# Patient Record
Sex: MALE | Race: WHITE | NOT HISPANIC OR LATINO | Employment: FULL TIME | URBAN - METROPOLITAN AREA
[De-identification: names, ages, dates, MRNs, and addresses within clinical notes are randomized per-mention and may not be internally consistent; named-entity substitution may affect disease eponyms.]

---

## 2024-05-10 ENCOUNTER — TELEPHONE (OUTPATIENT)
Dept: GASTROENTEROLOGY | Facility: CLINIC | Age: 39
End: 2024-05-10

## 2024-05-10 ENCOUNTER — APPOINTMENT (OUTPATIENT)
Dept: LAB | Facility: HOSPITAL | Age: 39
End: 2024-05-10
Payer: COMMERCIAL

## 2024-05-10 ENCOUNTER — OFFICE VISIT (OUTPATIENT)
Dept: GASTROENTEROLOGY | Facility: CLINIC | Age: 39
End: 2024-05-10
Payer: COMMERCIAL

## 2024-05-10 VITALS
DIASTOLIC BLOOD PRESSURE: 78 MMHG | BODY MASS INDEX: 21.42 KG/M2 | SYSTOLIC BLOOD PRESSURE: 118 MMHG | HEIGHT: 71 IN | WEIGHT: 153 LBS

## 2024-05-10 DIAGNOSIS — R10.84 GENERALIZED ABDOMINAL PAIN: ICD-10-CM

## 2024-05-10 DIAGNOSIS — R19.4 CHANGE IN BOWEL HABITS: Primary | ICD-10-CM

## 2024-05-10 DIAGNOSIS — R19.4 CHANGE IN BOWEL HABITS: ICD-10-CM

## 2024-05-10 PROBLEM — J45.909 ASTHMA: Status: ACTIVE | Noted: 2024-05-10

## 2024-05-10 LAB
ALBUMIN SERPL BCP-MCNC: 5.3 G/DL (ref 3.5–5)
ALP SERPL-CCNC: 60 U/L (ref 34–104)
ALT SERPL W P-5'-P-CCNC: 15 U/L (ref 7–52)
ANION GAP SERPL CALCULATED.3IONS-SCNC: 10 MMOL/L (ref 4–13)
AST SERPL W P-5'-P-CCNC: 16 U/L (ref 13–39)
BILIRUB SERPL-MCNC: 1.34 MG/DL (ref 0.2–1)
BUN SERPL-MCNC: 17 MG/DL (ref 5–25)
CALCIUM SERPL-MCNC: 9.8 MG/DL (ref 8.4–10.2)
CHLORIDE SERPL-SCNC: 106 MMOL/L (ref 96–108)
CO2 SERPL-SCNC: 26 MMOL/L (ref 21–32)
CREAT SERPL-MCNC: 0.91 MG/DL (ref 0.6–1.3)
CRP SERPL QL: <1 MG/L
ERYTHROCYTE [DISTWIDTH] IN BLOOD BY AUTOMATED COUNT: 12 % (ref 11.6–15.1)
ERYTHROCYTE [SEDIMENTATION RATE] IN BLOOD: 4 MM/HOUR (ref 0–14)
GFR SERPL CREATININE-BSD FRML MDRD: 106 ML/MIN/1.73SQ M
GLUCOSE P FAST SERPL-MCNC: 105 MG/DL (ref 65–99)
HCT VFR BLD AUTO: 46.3 % (ref 36.5–49.3)
HGB BLD-MCNC: 15.5 G/DL (ref 12–17)
IGA SERPL-MCNC: 238 MG/DL (ref 66–433)
MCH RBC QN AUTO: 30.4 PG (ref 26.8–34.3)
MCHC RBC AUTO-ENTMCNC: 33.5 G/DL (ref 31.4–37.4)
MCV RBC AUTO: 91 FL (ref 82–98)
PLATELET # BLD AUTO: 275 THOUSANDS/UL (ref 149–390)
PMV BLD AUTO: 10.3 FL (ref 8.9–12.7)
POTASSIUM SERPL-SCNC: 3.9 MMOL/L (ref 3.5–5.3)
PROT SERPL-MCNC: 8.1 G/DL (ref 6.4–8.4)
RBC # BLD AUTO: 5.1 MILLION/UL (ref 3.88–5.62)
SODIUM SERPL-SCNC: 142 MMOL/L (ref 135–147)
WBC # BLD AUTO: 7.61 THOUSAND/UL (ref 4.31–10.16)

## 2024-05-10 PROCEDURE — 82784 ASSAY IGA/IGD/IGG/IGM EACH: CPT

## 2024-05-10 PROCEDURE — 86258 DGP ANTIBODY EACH IG CLASS: CPT

## 2024-05-10 PROCEDURE — 85027 COMPLETE CBC AUTOMATED: CPT

## 2024-05-10 PROCEDURE — 36415 COLL VENOUS BLD VENIPUNCTURE: CPT

## 2024-05-10 PROCEDURE — 99204 OFFICE O/P NEW MOD 45 MIN: CPT | Performed by: INTERNAL MEDICINE

## 2024-05-10 PROCEDURE — 80053 COMPREHEN METABOLIC PANEL: CPT

## 2024-05-10 PROCEDURE — 85652 RBC SED RATE AUTOMATED: CPT

## 2024-05-10 PROCEDURE — 86364 TISS TRNSGLTMNASE EA IG CLAS: CPT

## 2024-05-10 PROCEDURE — 86140 C-REACTIVE PROTEIN: CPT

## 2024-05-10 RX ORDER — DICYCLOMINE HCL 20 MG
20 TABLET ORAL 2 TIMES DAILY
Qty: 60 TABLET | Refills: 5 | Status: SHIPPED | OUTPATIENT
Start: 2024-05-10

## 2024-05-10 NOTE — PROGRESS NOTES
[facility] - Outpatient Consultation  Alan Deluca 38 y.o. male MRN: [id number]  Encounter: [id number]          ASSESSMENT AND PLAN:      1. Change in bowel habits  2. Generalized abdominal pain  6 years of loose stools associated with intermittent abdominal pain.  Increasing anxiety associated with it.  Suspect IBS related but cannot exclude celiac disease or even lactose intolerance.  Infection less likely since has lasted many years although cannot completely exclude Giardia.  No real medications to attribute.  Anxiety probably making things worse.  - Celiac Disease Panel; Future  - Fecal fat, qualitative; Future  - C-reactive protein; Future  - CBC; Future  - Comprehensive metabolic panel; Future  - Sedimentation rate, automated; Future  - Calprotectin,Fecal; Future  - Clostridium difficile toxin by PCR with EIA; Future  - Giardia antigen; Future  - Fecal leukocytes; Future  - dicyclomine (BENTYL) 20 mg tablet; Take 1 tablet (20 mg total) by mouth 2 (two) times a day  Dispense: 60 tablet; Refill: 5  - Colonoscopy; Future  -If symptoms persist and workup negative would proceed with a CAT scan next    ______________________________________________________________________    HPI: This is a 38-year-old gentleman who is seen in the office today for evaluation of his chronic GI complaints.  He reports over the last 5 or 6 years he has had issues with altered bowel habits associated with abdominal pain.  He reports moving his bowels about twice a day and they tend to be anywhere from soft to loose.  He denies any rectal bleeding.  Denies any nocturnal stooling or fecal incontinence.  He reports intermittent abdominal pain which usually get worse right before he moves his bowels and then feels better after he finishes.  He does admit to occasional abdominal pain not associated with his bowels.  He reports some mild heartburn but denies any other significant upper GI symptoms.  He has  issues with anxiety which has gotten extremely worse associated with the symptoms since he is concerned about cancer.  He thinks he has some lactose intolerance but continues to drink dairy.  He denies any significant trigger foods that he has been able to identify otherwise.  His weight is stable.  He denies any family history of inflammatory bowel disease or celiac disease.  It sounds like his mother has irritable bowel syndrome.  He denied seeing a doctor about the symptoms.  He denies having any recent blood or stool studies      REVIEW OF SYSTEMS:    CONSTITUTIONAL: Denies any fever, chills, rigors, and weight loss.  HEENT: No earache or tinnitus. Denies hearing loss or visual disturbances.  CARDIOVASCULAR: No chest pain or palpitations.   RESPIRATORY: Denies any cough, hemoptysis, shortness of breath or dyspnea on exertion.  GASTROINTESTINAL: As noted in the History of Present Illness.   GENITOURINARY: No problems with urination. Denies any hematuria or dysuria.  NEUROLOGIC: No dizziness or vertigo, denies headaches.   MUSCULOSKELETAL: Denies any muscle or joint pain.   SKIN: Denies skin rashes or itching.   ENDOCRINE: Denies excessive thirst. Denies intolerance to heat or cold.  PSYCHOSOCIAL: Reports significant anxiety. Denies any recent memory loss.       Historical Information   Past medical history: Asthma  History reviewed. No pertinent surgical history.  Social History   Social History     Substance and Sexual Activity   Alcohol Use Yes     Social History     Substance and Sexual Activity   Drug Use Yes    Types: Marijuana     Social History     Tobacco Use   Smoking Status Former    Types: Cigarettes   Smokeless Tobacco Never     Family History   Problem Relation Age of Onset    No Known Problems Mother     No Known Problems Father     No Known Problems Maternal Grandmother     No Known Problems Maternal Grandfather     No Known Problems Paternal Grandmother     No Known Problems Paternal Grandfather   "      Meds/Allergies       Current Outpatient Medications:     dicyclomine (BENTYL) 20 mg tablet    Multiple Vitamins-Minerals (MULTIVITAMIN ADULTS PO)    Allergies   Allergen Reactions    Oxycodone-Acetaminophen Hives           Objective     Blood pressure 118/78, height 5' 11\" (1.803 m), weight 69.4 kg (153 lb). Body mass index is 21.34 kg/m².        PHYSICAL EXAM:      General Appearance:   Alert, cooperative, no distress   HEENT:   Normocephalic, atraumatic, anicteric.     Neck:  Supple, symmetrical, trachea midline   Lungs:   Clear to auscultation bilaterally; no rales, rhonchi or wheezing; respirations unlabored    Heart::   Regular rate and rhythm; no murmur, rub, or gallop.   Abdomen:   Soft, non-tender, non-distended; normal bowel sounds; no masses, no organomegaly    Genitalia:   Deferred    Rectal:   Deferred    Extremities:  No cyanosis, clubbing or edema    Pulses:  2+ and symmetric    Skin:  No jaundice, rashes, or lesions    Lymph nodes:  No palpable cervical lymphadenopathy        Lab Results:   No visits with results within 1 Day(s) from this visit.   Latest known visit with results is:   No results found for any previous visit.         Radiology Results:   No results found.  "

## 2024-05-10 NOTE — PATIENT INSTRUCTIONS
Stool studies and blood work as ordered.  Bentyl 20 mg twice a day for breakfast and dinner.  Colonoscopy.  Follow-up office visit 3 months

## 2024-05-10 NOTE — TELEPHONE ENCOUNTER
Scheduled date of colonoscopy (as of today): 5/17/24  Physician performing colonoscopy: Katina  Location of colonoscopy: [facility]  Bowel prep reviewed with patient: Miralax  Instructions reviewed with patient by: URIEL  Clearances: N

## 2024-05-10 NOTE — H&P (VIEW-ONLY)
[facility] - Outpatient Consultation  Alan Deluca 38 y.o. male MRN: [id number]  Encounter: [id number]          ASSESSMENT AND PLAN:      1. Change in bowel habits  2. Generalized abdominal pain  6 years of loose stools associated with intermittent abdominal pain.  Increasing anxiety associated with it.  Suspect IBS related but cannot exclude celiac disease or even lactose intolerance.  Infection less likely since has lasted many years although cannot completely exclude Giardia.  No real medications to attribute.  Anxiety probably making things worse.  - Celiac Disease Panel; Future  - Fecal fat, qualitative; Future  - C-reactive protein; Future  - CBC; Future  - Comprehensive metabolic panel; Future  - Sedimentation rate, automated; Future  - Calprotectin,Fecal; Future  - Clostridium difficile toxin by PCR with EIA; Future  - Giardia antigen; Future  - Fecal leukocytes; Future  - dicyclomine (BENTYL) 20 mg tablet; Take 1 tablet (20 mg total) by mouth 2 (two) times a day  Dispense: 60 tablet; Refill: 5  - Colonoscopy; Future  -If symptoms persist and workup negative would proceed with a CAT scan next    ______________________________________________________________________    HPI: This is a 38-year-old gentleman who is seen in the office today for evaluation of his chronic GI complaints.  He reports over the last 5 or 6 years he has had issues with altered bowel habits associated with abdominal pain.  He reports moving his bowels about twice a day and they tend to be anywhere from soft to loose.  He denies any rectal bleeding.  Denies any nocturnal stooling or fecal incontinence.  He reports intermittent abdominal pain which usually get worse right before he moves his bowels and then feels better after he finishes.  He does admit to occasional abdominal pain not associated with his bowels.  He reports some mild heartburn but denies any other significant upper GI symptoms.  He has  issues with anxiety which has gotten extremely worse associated with the symptoms since he is concerned about cancer.  He thinks he has some lactose intolerance but continues to drink dairy.  He denies any significant trigger foods that he has been able to identify otherwise.  His weight is stable.  He denies any family history of inflammatory bowel disease or celiac disease.  It sounds like his mother has irritable bowel syndrome.  He denied seeing a doctor about the symptoms.  He denies having any recent blood or stool studies      REVIEW OF SYSTEMS:    CONSTITUTIONAL: Denies any fever, chills, rigors, and weight loss.  HEENT: No earache or tinnitus. Denies hearing loss or visual disturbances.  CARDIOVASCULAR: No chest pain or palpitations.   RESPIRATORY: Denies any cough, hemoptysis, shortness of breath or dyspnea on exertion.  GASTROINTESTINAL: As noted in the History of Present Illness.   GENITOURINARY: No problems with urination. Denies any hematuria or dysuria.  NEUROLOGIC: No dizziness or vertigo, denies headaches.   MUSCULOSKELETAL: Denies any muscle or joint pain.   SKIN: Denies skin rashes or itching.   ENDOCRINE: Denies excessive thirst. Denies intolerance to heat or cold.  PSYCHOSOCIAL: Reports significant anxiety. Denies any recent memory loss.       Historical Information   Past medical history: Asthma  History reviewed. No pertinent surgical history.  Social History   Social History     Substance and Sexual Activity   Alcohol Use Yes     Social History     Substance and Sexual Activity   Drug Use Yes    Types: Marijuana     Social History     Tobacco Use   Smoking Status Former    Types: Cigarettes   Smokeless Tobacco Never     Family History   Problem Relation Age of Onset    No Known Problems Mother     No Known Problems Father     No Known Problems Maternal Grandmother     No Known Problems Maternal Grandfather     No Known Problems Paternal Grandmother     No Known Problems Paternal Grandfather   "      Meds/Allergies       Current Outpatient Medications:     dicyclomine (BENTYL) 20 mg tablet    Multiple Vitamins-Minerals (MULTIVITAMIN ADULTS PO)    Allergies   Allergen Reactions    Oxycodone-Acetaminophen Hives           Objective     Blood pressure 118/78, height 5' 11\" (1.803 m), weight 69.4 kg (153 lb). Body mass index is 21.34 kg/m².        PHYSICAL EXAM:      General Appearance:   Alert, cooperative, no distress   HEENT:   Normocephalic, atraumatic, anicteric.     Neck:  Supple, symmetrical, trachea midline   Lungs:   Clear to auscultation bilaterally; no rales, rhonchi or wheezing; respirations unlabored    Heart::   Regular rate and rhythm; no murmur, rub, or gallop.   Abdomen:   Soft, non-tender, non-distended; normal bowel sounds; no masses, no organomegaly    Genitalia:   Deferred    Rectal:   Deferred    Extremities:  No cyanosis, clubbing or edema    Pulses:  2+ and symmetric    Skin:  No jaundice, rashes, or lesions    Lymph nodes:  No palpable cervical lymphadenopathy        Lab Results:   No visits with results within 1 Day(s) from this visit.   Latest known visit with results is:   No results found for any previous visit.         Radiology Results:   No results found.  "

## 2024-05-11 ENCOUNTER — APPOINTMENT (OUTPATIENT)
Dept: LAB | Facility: HOSPITAL | Age: 39
End: 2024-05-11
Payer: COMMERCIAL

## 2024-05-11 DIAGNOSIS — R10.84 GENERALIZED ABDOMINAL PAIN: ICD-10-CM

## 2024-05-11 DIAGNOSIS — R19.4 CHANGE IN BOWEL HABITS: ICD-10-CM

## 2024-05-11 LAB
GLIADIN PEPTIDE IGA SER-ACNC: 8.5 U/ML
GLIADIN PEPTIDE IGA SER-ACNC: NEGATIVE
GLIADIN PEPTIDE IGG SER-ACNC: <0.4 U/ML
GLIADIN PEPTIDE IGG SER-ACNC: NEGATIVE
TTG IGA SER-ACNC: <0.5 U/ML
TTG IGA SER-ACNC: NEGATIVE
TTG IGG SER-ACNC: <0.8 U/ML
TTG IGG SER-ACNC: NEGATIVE

## 2024-05-11 PROCEDURE — 87493 C DIFF AMPLIFIED PROBE: CPT

## 2024-05-11 PROCEDURE — 89055 LEUKOCYTE ASSESSMENT FECAL: CPT

## 2024-05-11 PROCEDURE — 82705 FATS/LIPIDS FECES QUAL: CPT

## 2024-05-11 PROCEDURE — 87329 GIARDIA AG IA: CPT

## 2024-05-11 PROCEDURE — 83993 ASSAY FOR CALPROTECTIN FECAL: CPT

## 2024-05-12 LAB — C DIFF TOX GENS STL QL NAA+PROBE: NEGATIVE

## 2024-05-13 ENCOUNTER — TELEPHONE (OUTPATIENT)
Age: 39
End: 2024-05-13

## 2024-05-13 LAB
G LAMBLIA AG STL QL IA: NEGATIVE
WBC SPEC QL GRAM STN: NORMAL

## 2024-05-13 NOTE — TELEPHONE ENCOUNTER
Pt. Calling back reviewed lab work , advised that stool testing is still pending, pt. Feels the bentyl isn't working and is still have abdominal pain and diarrhea after eating, pt. Very anxious and worried about pending tests results and upcoming colonoscopy, pt. Is nervous he has colon cancer, offered reassurance, pt. Appreciative , advised I would update Dr. Hendricks on symptoms

## 2024-05-14 NOTE — TELEPHONE ENCOUNTER
Pt called to report he continues with abdominal pain after every meal. Advised to try Bentyl TID before meals and to keep Colonoscopy appt. Provided reassurance.

## 2024-05-15 LAB
FAT STL QL: NORMAL
NEUTRAL FAT STL QL: NORMAL

## 2024-05-17 ENCOUNTER — ANESTHESIA (OUTPATIENT)
Dept: GASTROENTEROLOGY | Facility: AMBULATORY SURGERY CENTER | Age: 39
End: 2024-05-17

## 2024-05-17 ENCOUNTER — ANESTHESIA EVENT (OUTPATIENT)
Dept: GASTROENTEROLOGY | Facility: AMBULATORY SURGERY CENTER | Age: 39
End: 2024-05-17

## 2024-05-17 ENCOUNTER — HOSPITAL ENCOUNTER (OUTPATIENT)
Dept: GASTROENTEROLOGY | Facility: AMBULATORY SURGERY CENTER | Age: 39
Discharge: HOME | End: 2024-05-17
Payer: COMMERCIAL

## 2024-05-17 VITALS
SYSTOLIC BLOOD PRESSURE: 112 MMHG | TEMPERATURE: 98 F | BODY MASS INDEX: 21.42 KG/M2 | WEIGHT: 153 LBS | DIASTOLIC BLOOD PRESSURE: 83 MMHG | OXYGEN SATURATION: 99 % | RESPIRATION RATE: 17 BRPM | HEIGHT: 71 IN | HEART RATE: 57 BPM

## 2024-05-17 DIAGNOSIS — R10.84 GENERALIZED ABDOMINAL PAIN: ICD-10-CM

## 2024-05-17 DIAGNOSIS — R19.4 CHANGE IN BOWEL HABITS: ICD-10-CM

## 2024-05-17 PROCEDURE — 45380 COLONOSCOPY AND BIOPSY: CPT | Performed by: INTERNAL MEDICINE

## 2024-05-17 PROCEDURE — 88305 TISSUE EXAM BY PATHOLOGIST: CPT | Performed by: PATHOLOGY

## 2024-05-17 RX ORDER — FLUTICASONE PROPIONATE AND SALMETEROL 250; 50 UG/1; UG/1
1 POWDER RESPIRATORY (INHALATION) DAILY
COMMUNITY

## 2024-05-17 RX ORDER — SODIUM CHLORIDE, SODIUM LACTATE, POTASSIUM CHLORIDE, CALCIUM CHLORIDE 600; 310; 30; 20 MG/100ML; MG/100ML; MG/100ML; MG/100ML
50 INJECTION, SOLUTION INTRAVENOUS CONTINUOUS
Status: DISCONTINUED | OUTPATIENT
Start: 2024-05-17 | End: 2024-05-21 | Stop reason: HOSPADM

## 2024-05-17 RX ORDER — PROPOFOL 10 MG/ML
INJECTION, EMULSION INTRAVENOUS AS NEEDED
Status: DISCONTINUED | OUTPATIENT
Start: 2024-05-17 | End: 2024-05-17

## 2024-05-17 RX ORDER — LIDOCAINE HYDROCHLORIDE 10 MG/ML
INJECTION, SOLUTION EPIDURAL; INFILTRATION; INTRACAUDAL; PERINEURAL AS NEEDED
Status: DISCONTINUED | OUTPATIENT
Start: 2024-05-17 | End: 2024-05-17

## 2024-05-17 RX ORDER — ALBUTEROL SULFATE 90 UG/1
2 AEROSOL, METERED RESPIRATORY (INHALATION) EVERY 6 HOURS PRN
COMMUNITY

## 2024-05-17 RX ADMIN — PROPOFOL 20 MG: 10 INJECTION, EMULSION INTRAVENOUS at 15:06

## 2024-05-17 RX ADMIN — PROPOFOL 30 MG: 10 INJECTION, EMULSION INTRAVENOUS at 15:04

## 2024-05-17 RX ADMIN — PROPOFOL 50 MG: 10 INJECTION, EMULSION INTRAVENOUS at 15:00

## 2024-05-17 RX ADMIN — PROPOFOL 200 MG: 10 INJECTION, EMULSION INTRAVENOUS at 14:58

## 2024-05-17 RX ADMIN — LIDOCAINE HYDROCHLORIDE 50 MG: 10 INJECTION, SOLUTION EPIDURAL; INFILTRATION; INTRACAUDAL; PERINEURAL at 14:57

## 2024-05-17 RX ADMIN — SODIUM CHLORIDE, SODIUM LACTATE, POTASSIUM CHLORIDE, CALCIUM CHLORIDE 50 ML/HR: 600; 310; 30; 20 INJECTION, SOLUTION INTRAVENOUS at 13:59

## 2024-05-17 NOTE — INTERVAL H&P NOTE
H&P reviewed. After examining the patient I find no changes in the patients condition since the H&P had been written.    Vitals:    05/17/24 1350   BP: 117/81   Pulse: 64   Resp: 16   Temp: 98 °F (36.7 °C)   SpO2: 100%

## 2024-05-17 NOTE — ANESTHESIA PREPROCEDURE EVALUATION
Procedure:  COLONOSCOPY    Relevant Problems   ANESTHESIA (within normal limits)      CARDIO (within normal limits)      PULMONARY   (+) Asthma (Well controlled, no recent illness/exacerbation)   (-) Sleep apnea   (-) Smoking   (-) URI (upper respiratory infection)      Physical Exam    Airway    Mallampati score: I  TM Distance: >3 FB  Neck ROM: full     Dental   No notable dental hx     Cardiovascular      Pulmonary      Other Findings      Lab Results   Component Value Date    WBC 7.61 05/10/2024    HGB 15.5 05/10/2024     05/10/2024     Lab Results   Component Value Date    SODIUM 142 05/10/2024    K 3.9 05/10/2024    BUN 17 05/10/2024    CREATININE 0.91 05/10/2024    EGFR 106 05/10/2024     Anesthesia Plan  ASA Score- 2     Anesthesia Type- IV sedation with anesthesia with ASA Monitors.         Additional Monitors:     Airway Plan:            Plan Factors-Exercise tolerance (METS): >4 METS.    Chart reviewed.   Existing labs reviewed. Patient summary reviewed.    Patient is not a current smoker.              Induction- intravenous.    Postoperative Plan-         Informed Consent- Anesthetic plan and risks discussed with patient.  I personally reviewed this patient with the CRNA. Discussed and agreed on the Anesthesia Plan with the CRNA..

## 2024-05-18 LAB — CALPROTECTIN STL-MCNT: 35 UG/G (ref 0–120)

## 2024-05-22 PROCEDURE — 88305 TISSUE EXAM BY PATHOLOGIST: CPT | Performed by: PATHOLOGY

## 2024-05-23 NOTE — RESULT ENCOUNTER NOTE
To: Alan Deluca    The biopsies obtained during your colonoscopy were negative/normal.  There is no evidence of colitis, infection or any precancerous changes.  There is no change in the recommendations and follow-up as planned.  Repeat colonoscopy in 10 years for routine screening as discussed.  Best regards,    Cathie Ambriz MD

## 2024-05-31 ENCOUNTER — NURSE TRIAGE (OUTPATIENT)
Age: 39
End: 2024-05-31

## 2024-05-31 DIAGNOSIS — R19.4 CHANGE IN BOWEL HABITS: ICD-10-CM

## 2024-05-31 DIAGNOSIS — R10.84 GENERALIZED ABDOMINAL PAIN: ICD-10-CM

## 2024-05-31 RX ORDER — DICYCLOMINE HCL 20 MG
20 TABLET ORAL 2 TIMES DAILY
Qty: 60 TABLET | Refills: 5 | Status: SHIPPED | OUTPATIENT
Start: 2024-05-31

## 2024-05-31 NOTE — TELEPHONE ENCOUNTER
Called pt and spoke with him, informed him to decrease to twice a day and sent refill to Dr. Hendricks to sign off on

## 2024-05-31 NOTE — TELEPHONE ENCOUNTER
"Patient states that he started Dicyclomine and since he started taking it he started with back pain.   He states that he feels the pain after he takes the medication.  States that he doesn't know if the medication is helping that much but It is helping a little bit.   States that that the paper he got told him to take it 3 times a day and that's what he is doing. He would like a refill on that medication also.   Told him that I wouldn't put through a refill until a provider looks over everything.    Please review and advise.         Reason for Disposition   Caller has NON-URGENT medicine question about med that PCP or specialist prescribed and triager unable to answer question    Answer Assessment - Initial Assessment Questions  1. NAME of MEDICATION: \"What medicine are you calling about?\"      Dicyclomine   2. QUESTION: \"What is your question?\" (e.g., medication refill, side effect)      Patient states that since he started the medication that he's having back pain after taking it.    Protocols used: Medication Question Call-ADULT-[place]    "

## 2024-08-28 ENCOUNTER — OFFICE VISIT (OUTPATIENT)
Dept: GASTROENTEROLOGY | Facility: CLINIC | Age: 39
End: 2024-08-28
Payer: COMMERCIAL

## 2024-08-28 VITALS
HEIGHT: 71 IN | WEIGHT: 156 LBS | SYSTOLIC BLOOD PRESSURE: 116 MMHG | DIASTOLIC BLOOD PRESSURE: 70 MMHG | BODY MASS INDEX: 21.84 KG/M2

## 2024-08-28 DIAGNOSIS — R10.84 GENERALIZED ABDOMINAL PAIN: Primary | ICD-10-CM

## 2024-08-28 PROCEDURE — 99213 OFFICE O/P EST LOW 20 MIN: CPT | Performed by: INTERNAL MEDICINE

## 2024-08-28 RX ORDER — AMITRIPTYLINE HYDROCHLORIDE 10 MG/1
20 TABLET ORAL
Qty: 60 TABLET | Refills: 12 | Status: SHIPPED | OUTPATIENT
Start: 2024-08-28

## 2024-08-28 NOTE — PATIENT INSTRUCTIONS
Stop dicyclomine consistently and use it as needed.  Start probiotic daily.  Amitriptyline 20 mg at bedtime.  Call me with an update in 1 month.  If not improving then we will get a CAT scan.  Follow-up office visit 3 months

## 2024-08-28 NOTE — PROGRESS NOTES
[facility] - Outpatient Follow-up Note  Alan Deluca 38 y.o. male MRN: [id number]  Encounter: [id number]    ASSESSMENT AND PLAN:      1. Generalized abdominal pain  Suspect diarrhea predominant IBS.  Workup has included stool studies, blood work, and colonoscopy and biopsies.  No improvement with Bentyl.  Still complaining of vague none specific abdominal discomfort.  Moving his bowels 3-4 times a day especially in the morning and they are loose  -Stop Bentyl  -Start probiotic daily  - amitriptyline (ELAVIL) 10 mg tablet; Take 2 tablets (20 mg total) by mouth daily at bedtime  Dispense: 60 tablet; Refill: 12  -Will call with an update in 1 month.  If no better we will proceed with CAT scan      Follow up appointment: 3 months    _______________________      Chief Complaint   Patient presents with    Follow up-still having issues, somewhat worse       HPI:   Patient is a 38 y.o. male with a significant PMH of abdominal pain and diarrhea probably led to irritable bowel syndrome who is presenting for follow up.  A workup at this point has included negative stool studies, blood work, and a colonoscopy with random biopsies.  Bentyl was prescribed which he reports caused him to have back pain so he is not taking it.  He continues to have diffuse vaguely described abdominal discomfort.  He denies any real tenderness though.  This is maybe worse since the last time I saw him.  He is moving his bowels about 4 times a day and they tend to be soft.  He denies any rectal bleeding or melena.  He denies any upper GI symptoms.  His weight is increased chest    Historical Information   Past Medical History:   Diagnosis Date    Asthma     Eczema      Past Surgical History:   Procedure Laterality Date    COLONOSCOPY      MANDIBLE SURGERY Right      Social History     Substance and Sexual Activity   Alcohol Use Yes    Alcohol/week: 3.0 standard drinks of alcohol    Types: 3 Cans of beer per week     "Comment: 4-5 on a weekend     Social History     Substance and Sexual Activity   Drug Use Yes    Types: Marijuana    Comment: anxiety/ last used on 5/15     Social History     Tobacco Use   Smoking Status Former    Current packs/day: 0.50    Average packs/day: 0.5 packs/day for 10.0 years (5.0 ttl pk-yrs)    Types: Cigarettes   Smokeless Tobacco Never     Family History   Problem Relation Age of Onset    ADD / ADHD Mother     No Known Problems Father     No Known Problems Maternal Grandmother     No Known Problems Maternal Grandfather     No Known Problems Paternal Grandmother     No Known Problems Paternal Grandfather          Current Outpatient Medications:     albuterol (PROVENTIL HFA,VENTOLIN HFA) 90 mcg/act inhaler    amitriptyline (ELAVIL) 10 mg tablet    Fluticasone-Salmeterol (Advair) 250-50 mcg/dose inhaler    Multiple Vitamins-Minerals (MULTIVITAMIN ADULTS PO)  Allergies   Allergen Reactions    Oxycodone-Acetaminophen Hives     Reviewed medications and allergies 2320and updated as indicated    PHYSICAL EXAM:    Blood pressure 116/70, height 5' 11\" (1.803 m), weight 70.8 kg (156 lb). Body mass index is 21.76 kg/m².  General Appearance: NAD, cooperative, alert  Eyes: Anicteric  Chest: Clear to auscultation  Heart: Regular rate and rhythm   GI:  Soft, non-tender, non-distended; normal bowel sounds; no masses, no organomegaly   Rectal: Deferred  Musculoskeletal: No edema.  Skin:  No jaundice    Lab Results:   Lab Results   Component Value Date    WBC 7.61 05/10/2024    HGB 15.5 05/10/2024    MCV 91 05/10/2024     05/10/2024     Lab Results   Component Value Date    K 3.9 05/10/2024     05/10/2024    CO2 26 05/10/2024    BUN 17 05/10/2024    CREATININE 0.91 05/10/2024    GLUF 105 (H) 05/10/2024    CALCIUM 9.8 05/10/2024    AST 16 05/10/2024    ALT 15 05/10/2024    ALKPHOS 60 05/10/2024    EGFR 106 05/10/2024       Radiology Results:   No results found.  "

## 2024-12-05 ENCOUNTER — TELEPHONE (OUTPATIENT)
Dept: GASTROENTEROLOGY | Facility: CLINIC | Age: 39
End: 2024-12-05